# Patient Record
Sex: FEMALE | Race: WHITE
[De-identification: names, ages, dates, MRNs, and addresses within clinical notes are randomized per-mention and may not be internally consistent; named-entity substitution may affect disease eponyms.]

---

## 2018-07-30 ENCOUNTER — HOSPITAL ENCOUNTER (OUTPATIENT)
Dept: HOSPITAL 76 - LAB.WCP | Age: 68
Discharge: HOME | End: 2018-07-30
Attending: FAMILY MEDICINE
Payer: COMMERCIAL

## 2018-07-30 DIAGNOSIS — M25.50: ICD-10-CM

## 2018-07-30 DIAGNOSIS — R53.83: Primary | ICD-10-CM

## 2018-07-30 DIAGNOSIS — E78.5: ICD-10-CM

## 2018-07-30 LAB
ALBUMIN DIAFP-MCNC: 3.8 G/DL (ref 3.2–5.5)
ALBUMIN/GLOB SERPL: 1.1 {RATIO} (ref 1–2.2)
ALP SERPL-CCNC: 40 IU/L (ref 42–121)
ALT SERPL W P-5'-P-CCNC: 19 IU/L (ref 10–60)
ANION GAP SERPL CALCULATED.4IONS-SCNC: 5 MMOL/L (ref 6–13)
AST SERPL W P-5'-P-CCNC: 24 IU/L (ref 10–42)
BILIRUB BLD-MCNC: 0.5 MG/DL (ref 0.2–1)
BUN SERPL-MCNC: 13 MG/DL (ref 6–20)
CALCIUM UR-MCNC: 9.1 MG/DL (ref 8.5–10.3)
CHLORIDE SERPL-SCNC: 105 MMOL/L (ref 101–111)
CHOLEST SERPL-MCNC: 244 MG/DL
CO2 SERPL-SCNC: 27 MMOL/L (ref 21–32)
CREAT SERPLBLD-SCNC: 0.6 MG/DL (ref 0.4–1)
CRP SERPL-MCNC: < 1 MG/DL (ref 0–1)
ERYTHROCYTE [DISTWIDTH] IN BLOOD BY AUTOMATED COUNT: 12.7 % (ref 12–15)
GFRSERPLBLD MDRD-ARVRAT: 100 ML/MIN/{1.73_M2} (ref 89–?)
GLOBULIN SER-MCNC: 3.6 G/DL (ref 2.1–4.2)
GLUCOSE SERPL-MCNC: 92 MG/DL (ref 70–100)
HDLC SERPL-MCNC: 70 MG/DL
HDLC SERPL: 3.5 {RATIO} (ref ?–4.4)
HGB UR QL STRIP: 13.2 G/DL (ref 12–16)
LDLC SERPL CALC-MCNC: 151 MG/DL
LDLC/HDLC SERPL: 2.2 {RATIO} (ref ?–4.4)
MCH RBC QN AUTO: 32.8 PG (ref 27–31)
MCHC RBC AUTO-ENTMCNC: 33.8 G/DL (ref 32–36)
MCV RBC AUTO: 97.2 FL (ref 81–99)
NEUTROPHILS # SNV AUTO: 5.1 X10^3/UL (ref 4.8–10.8)
PDW BLD AUTO: 8.3 FL (ref 7.9–10.8)
PLATELET # BLD: 284 10^3/UL (ref 130–450)
PROT SPEC-MCNC: 7.4 G/DL (ref 6.7–8.2)
RBC MAR: 4.02 10^6/UL (ref 4.2–5.4)
RHEUMATOID FACT SER QL: NEGATIVE
SODIUM SERPLBLD-SCNC: 137 MMOL/L (ref 135–145)
URATE SERPL-MCNC: 3.9 MG/DL (ref 2.6–7.2)
VLDLC SERPL-SCNC: 23 MG/DL

## 2018-07-30 PROCEDURE — 84550 ASSAY OF BLOOD/URIC ACID: CPT

## 2018-07-30 PROCEDURE — 80053 COMPREHEN METABOLIC PANEL: CPT

## 2018-07-30 PROCEDURE — 85027 COMPLETE CBC AUTOMATED: CPT

## 2018-07-30 PROCEDURE — 36415 COLL VENOUS BLD VENIPUNCTURE: CPT

## 2018-07-30 PROCEDURE — 83721 ASSAY OF BLOOD LIPOPROTEIN: CPT

## 2018-07-30 PROCEDURE — 86140 C-REACTIVE PROTEIN: CPT

## 2018-07-30 PROCEDURE — 85651 RBC SED RATE NONAUTOMATED: CPT

## 2018-07-30 PROCEDURE — 84443 ASSAY THYROID STIM HORMONE: CPT

## 2018-07-30 PROCEDURE — 86430 RHEUMATOID FACTOR TEST QUAL: CPT

## 2018-07-30 PROCEDURE — 86200 CCP ANTIBODY: CPT

## 2018-07-30 PROCEDURE — 80061 LIPID PANEL: CPT

## 2018-08-13 ENCOUNTER — HOSPITAL ENCOUNTER (OUTPATIENT)
Dept: HOSPITAL 76 - DI | Age: 68
Discharge: HOME | End: 2018-08-13
Attending: FAMILY MEDICINE
Payer: COMMERCIAL

## 2018-08-13 DIAGNOSIS — Z78.0: ICD-10-CM

## 2018-08-13 DIAGNOSIS — M85.89: Primary | ICD-10-CM

## 2018-08-13 PROCEDURE — 77080 DXA BONE DENSITY AXIAL: CPT

## 2018-10-08 ENCOUNTER — HOSPITAL ENCOUNTER (OUTPATIENT)
Dept: HOSPITAL 76 - DI.N | Age: 68
Discharge: HOME | End: 2018-10-08
Attending: GENERAL ACUTE CARE HOSPITAL
Payer: COMMERCIAL

## 2018-10-08 DIAGNOSIS — N63.42: ICD-10-CM

## 2018-10-08 DIAGNOSIS — Z12.31: Primary | ICD-10-CM

## 2018-10-08 PROCEDURE — 77067 SCR MAMMO BI INCL CAD: CPT

## 2018-10-09 NOTE — MAMMOGRAPHY REPORT
Reason:  SCREENING MAMMO

Procedure Date:  10/08/2018   

Accession Number:  [id number] / [id number]                    

Procedure:  MGN - Screening Mammo Dig Bilat CPT Code:  

 

FULL RESULT:

 

 

EXAM: Screening Mammo Dig Bilat

 

DATE: 10/8/2018 10:43 AM

 

CLINICAL HISTORY: 68-year-old female presents for screening mammogram.

 

TECHNIQUE: Bilateral CC and MLO views were obtained.

 

COMPARISON: 10/24/2016.

 

FINDINGS:

The breasts demonstrate scattered fibroglandular densities bilaterally. 

Typically benign vascular calcifications are seen bilaterally. A 

hyperdense mass with ill-defined borders in the upper left breast 6 cm 

deep to the nipple not definitely seen on the cc view requires additional 

spot view evaluation ideally including tomography and possibly 

ultrasound. No clustered microcalcifications, or regions of architectural 

distortion are identified.

 

IMPRESSION: Incomplete examination

 

RECOMMENDATION: Additional evaluation as above.

 

BIRADS CATEGORY 0: Incomplete examination

 

STANDARD QUALIFYING STATEMENTS:

1.  This examination was reviewed with the aid of Computer-Aided 

Detection (CAD).

2.  A negative or benign  imaging report should not delay biopsy if 

clinically suspicious findings are present.  Consider surgical 

consultation if warrented.  More than 5% of cancers are not identified by 

imaging.

3.  Dense breasts may obscure an underlying neoplasm.

4. This examination was reviewed without the aid of 3D breast imaging 

(tomosynthesis).

## 2019-03-11 ENCOUNTER — HOSPITAL ENCOUNTER (OUTPATIENT)
Dept: HOSPITAL 76 - DI | Age: 69
Discharge: HOME | End: 2019-03-11
Attending: FAMILY MEDICINE
Payer: COMMERCIAL

## 2019-03-11 DIAGNOSIS — R92.8: Primary | ICD-10-CM

## 2019-03-11 NOTE — MAMMOGRAPHY REPORT
Reason:  ABN MAMMO - LT SPEC VIEWS

Procedure Date:  03/11/2019   

Accession Number:  [id number] / [id number]                    

Procedure:  HIRA - Diag Special Views Dig LT CPT Code:  

 

FULL RESULT:

 

 

EXAM: Diag Special Views Dig LT

 

DATE: 3/11/2019 9:24 AM

 

CLINICAL HISTORY:

 

TECHNIQUE: Recall from screening exam 10/8/2018 for one view asymmetry 

left MLO. No reported personal or family history of breast cancer.

 

COMPARISON: 10/8/2018, 10/24/2016

 

FINDINGS:

The breast demonstrates scattered fibroglandular density.

 

Left breast: There are no suspicious masses, calcifications or areas of 

distortion. One view asymmetry recalled from screening exam superior left 

MLO view does not persist on additional views, consistent with summation 

of normal tissues.

 

IMPRESSION: Negative examination

 

RECOMMENDATION: Routine annual screening unless otherwise clinically 

indicated.

 

BI-RADS CATEGORY 1: Negative

 

STANDARD QUALIFYING STATEMENTS:

1.  This examination was not reviewed with the aid of Computer-Aided 

Detection (CAD).

2.  A negative or benign  imaging report should not preclude biopsy if 

clinically suspicious findings are present.

3.  Dense breasts may obscure an underlying neoplasm.

4. This examination was reviewed with the aid of 3D breast imaging 

(tomosynthesis).

## 2019-08-15 ENCOUNTER — HOSPITAL ENCOUNTER (OUTPATIENT)
Dept: HOSPITAL 76 - SDS | Age: 69
Discharge: HOME | End: 2019-08-15
Attending: OPHTHALMOLOGY
Payer: COMMERCIAL

## 2019-08-15 VITALS — DIASTOLIC BLOOD PRESSURE: 62 MMHG | SYSTOLIC BLOOD PRESSURE: 114 MMHG

## 2019-08-15 DIAGNOSIS — H25.811: Primary | ICD-10-CM

## 2019-08-15 DIAGNOSIS — Z87.891: ICD-10-CM

## 2019-08-15 DIAGNOSIS — Z87.11: ICD-10-CM

## 2019-08-15 PROCEDURE — 66984 XCAPSL CTRC RMVL W/O ECP: CPT

## 2019-08-15 PROCEDURE — 08RJ3JZ REPLACEMENT OF RIGHT LENS WITH SYNTHETIC SUBSTITUTE, PERCUTANEOUS APPROACH: ICD-10-PCS | Performed by: OPHTHALMOLOGY

## 2019-08-15 NOTE — OPERATIVE REPORT
DATE OF SERVICE: 08/15/2019

Physician: Otis Brandt MD

 

PREOPERATIVE DIAGNOSIS:  Visually significant cataract, right eye.  This was her first cataract surge
ry.

 

POSTOPERATIVE DIAGNOSIS:  Visually significant cataract, right eye.  This was her first cataract surg
brian.

 

DESCRIPTION OF PROCEDURE:  Phacoemulsification with posterior chamber intraocular lens implant, right
 eye with laser assist.

 

SURGEON:  Otis Brandt MD

 

ANESTHESIA:  Monitored anesthesia care.

 

COMPLICATIONS:  None.

 

OPERATIVE INDICATIONS:  This is a 68-year-old woman with progressive vision loss in the right eye due
 to 2+ nuclear sclerotic and 2+ cortical cataract.  Best corrected visual acuity was 20/30 with glare
 to 20/125 in the right eye.  Indications for surgery were difficulty seeing words on a computer scre
en, difficulty reading, difficulty seeing words closed caption or game scores on TV, difficulty seein
g street signs and difficulty driving at night because of headlights from other vehicles.  She was co
nsented at length concerning risks and benefits of cataract surgery, after which she expressed a alyse
re to proceed with surgery.

 

OPERATIVE PROCEDURE:  The patient was taken to OR #3 and placed under monitored anesthesia care.  A s
urgical timeout was conducted confirming correct patient, correct procedure, and correct surgical sit
e.  She was placed on the LenSx laser and her eye was docked with the laser interface. The laser perf
ormed the capsulotomy, lens softening, phaco wounds and arcuate keratotomy incisions.  She was then m
robinson to the operating microscope, given topical anesthesia, and then prepped and draped in the usual 
sterile fashion.  The eye was entered at the 12 and 9 o'clock positions.  Intracameral Shugarcaine wa
s injected into the anterior chamber, followed by Viscoat.  Capsulorrhexis flap created by the LenSx 
laser was removed from the anterior chamber.  The nucleus was hydrodissected and phacoemulsified.  Th
e cortex was evacuated using automated infusion and aspiration.  Provisc was injected in the capsular
 bag, and a 19.0 diopter multifocal intraocular lens was injected into the bag.  Approximately 0.8 mL
 of a mixture of triamcinolone, moxifloxacin and vancomycin was injected subconjunctivally in the sup
erior quadrant for infection and inflammation prophylaxis.  I and A, was used to evacuate the viscoel
astic materials.  The eye was inflated to physiologic pressure using balanced salt solution and found
 to be watertight.  The patient was taken from the operating room in good condition and given postope
rative instructions.

 

 

DD: 08/15/2019 10:37

TD: 08/15/2019 14:07

Job #: [id number]

## 2019-08-15 NOTE — ANESTHESIA
Pre-Anesthesia VS, & Labs





- Diagnosis


R senile combined cataract





- Procedure





R laser assisted extraction cataract w IOL


Vital Signs: 





                                        











Temp Pulse Resp BP Pulse Ox


 


 36.5 C   84   18   130/65   94 


 


 08/15/19 08:46  08/15/19 08:46  08/15/19 08:46  08/15/19 08:46  08/15/19 08:46














                                        





Height                           5 ft 3 in


Weight (kg)                      97.4 kg


Body Mass Index                  33.6











- NPO


>8 hours





- Pregnancy


Is Patient Pregnant?: No





Home Medications and Allergies


Home Medications: 


Ambulatory Orders





Cyanocobalamin (Vitamin B-12) [Vitamin B-12] 1,000 mcg PO DAILY 08/15/19 


Glucos Sul 2Kcl/MSM/Chond/C/Mn [Glucosamine Chondroitin Cap] 1 each PO DAILY 

08/15/19 


RX: Montelukast Sodium 10 mg PO DAILY 08/15/19 


RX: Red Yeast Rice 600 mg PO DAILY 08/15/19 


RX: Vitamin E 400 unit PO DAILY 08/15/19 











                                        





Cyanocobalamin (Vitamin B-12) [Vitamin B-12] 1,000 mcg PO DAILY 08/15/19 


Glucos Sul 2Kcl/MSM/Chond/C/Mn [Glucosamine Chondroitin Cap] 1 each PO DAILY 

08/15/19 


Montelukast Sodium 10 mg PO DAILY 08/15/19 


Red Yeast Rice 600 mg PO DAILY 08/15/19 


Vitamin E 400 unit PO DAILY 08/15/19 








Allergies/Adverse Reactions: 


                                    Allergies











Allergy/AdvReac Type Severity Reaction Status Date / Time


 


bupropion HCl * AdvReac  Hallucinati Verified 08/15/19 08:45





[From Wellbutrin]   ons  














Anes History & Medical History





- Anesthetic History


Anesthesia Complications: reports: No previous complications


Family history of Anesthesia Complications: Denies


Family history of Malignant Hyperthermia: Denies





- Medical History


Cardiovascular: reports: None


Pulmonary: reports: None


Gastrointestinal: reports: Ulcers


Urinary: reports: None


Musculoskeletal: reports: None


Endocrine/Autoimmune: reports: None


Blood Disorders: reports: None


Skin: reports: None


Smoking Status: Former smoker





- Surgical History


Gynecologic: 





Exam


General: Alert, Oriented x3


Dental: WNL (one temp cap at back lower left)


Mouth Opening: 3 Fingerbreadth


Neck Mobility: Normal


Mallampati classification: II


Thyromental Distance: 4-6 cm


Respiratory: Lungs clear, Normal breath sounds, No respiratory distress, No 

accessory muscle use


Cardiovascular: Regular rate


Neurological: Normal speech


Mental/Cognitive Status: Alert/Oriented X3, Normal for patient


Cognitive Status: Within normal limits





Plan


Anesthesia Type: MAC


Consent for Procedure(s) Verified and Reviewed: Yes


Code Status: Attempt Resuscitation


ASA classification: 2-Mild systemic disease


Is this case an emergency?: No

## 2020-10-28 ENCOUNTER — HOSPITAL ENCOUNTER (OUTPATIENT)
Dept: HOSPITAL 76 - LAB.WCP | Age: 70
Discharge: HOME | End: 2020-10-28
Attending: FAMILY MEDICINE
Payer: COMMERCIAL

## 2020-10-28 DIAGNOSIS — M25.50: Primary | ICD-10-CM

## 2020-10-28 LAB
BASOPHILS NFR BLD AUTO: 0 10^3/UL (ref 0–0.1)
BASOPHILS NFR BLD AUTO: 0.6 %
CRP SERPL-MCNC: < 1 MG/DL (ref 0–1)
EOSINOPHIL # BLD AUTO: 0.2 10^3/UL (ref 0–0.7)
EOSINOPHIL NFR BLD AUTO: 4.1 %
ERYTHROCYTE [DISTWIDTH] IN BLOOD BY AUTOMATED COUNT: 12.8 % (ref 12–15)
HGB UR QL STRIP: 13.3 G/DL (ref 12–16)
LYMPHOCYTES # SPEC AUTO: 2.1 10^3/UL (ref 1.5–3.5)
LYMPHOCYTES NFR BLD AUTO: 39.8 %
MCH RBC QN AUTO: 32.1 PG (ref 27–31)
MCHC RBC AUTO-ENTMCNC: 32.3 G/DL (ref 32–36)
MCV RBC AUTO: 99.5 FL (ref 81–99)
MONOCYTES # BLD AUTO: 0.7 10^3/UL (ref 0–1)
MONOCYTES NFR BLD AUTO: 13.8 %
NEUTROPHILS # BLD AUTO: 2.1 10^3/UL (ref 1.5–6.6)
NEUTROPHILS # SNV AUTO: 5.2 X10^3/UL (ref 4.8–10.8)
NEUTROPHILS NFR BLD AUTO: 41.1 %
PDW BLD AUTO: 10 FL (ref 7.9–10.8)
PLATELET # BLD: 309 10^3/UL (ref 130–450)
RBC MAR: 4.14 10^6/UL (ref 4.2–5.4)
URATE SERPL-MCNC: 4.9 MG/DL (ref 2.6–7.2)

## 2020-10-28 PROCEDURE — 36415 COLL VENOUS BLD VENIPUNCTURE: CPT

## 2020-10-28 PROCEDURE — 85025 COMPLETE CBC W/AUTO DIFF WBC: CPT

## 2020-10-28 PROCEDURE — 84550 ASSAY OF BLOOD/URIC ACID: CPT

## 2020-10-28 PROCEDURE — 86140 C-REACTIVE PROTEIN: CPT

## 2020-10-28 PROCEDURE — 85651 RBC SED RATE NONAUTOMATED: CPT

## 2020-10-29 ENCOUNTER — HOSPITAL ENCOUNTER (OUTPATIENT)
Dept: HOSPITAL 76 - DI | Age: 70
Discharge: HOME | End: 2020-10-29
Attending: FAMILY MEDICINE
Payer: COMMERCIAL

## 2020-10-29 DIAGNOSIS — M47.816: Primary | ICD-10-CM

## 2020-10-29 PROCEDURE — 72100 X-RAY EXAM L-S SPINE 2/3 VWS: CPT

## 2020-10-29 NOTE — XRAY REPORT
PROCEDURE:  Lumbar Spine 2 View

 

INDICATIONS:  LOW BACK PAIN,ACUTE

 

TECHNIQUE:  2 views of the lumbar spine were acquired.  

 

COMPARISON:  None.

 

FINDINGS:  

 

Bones:  5 non-rib-bearing vertebrae are present.  There is grade 1 anterolisthesis of L4 on L5. No pa
rs defect visualized. Moderate degenerative changes of the mid and lower lumbar spine with associated
 facet arthropathy. Disc space loss at L4-5.  No acute vertebral body compression fractures.  No susp
icious bony lesions.  

 

Soft tissues:  Overlying bowel gas pattern is normal.  No suspicious soft tissue calcifications.  

 

IMPRESSION:  

 

Grade 1 anterolisthesis of L4 on L5.

 

Multilevel mid and lower lumbar spondylosis most pronounced at L4-5.

 

Reviewed by: Rivera Zheng MD on 10/29/2020 2:03 PM PDT

Approved by: Rivera Zheng MD on 10/29/2020 2:03 PM PDT

 

 

Station ID:  [id number]

## 2020-11-03 ENCOUNTER — HOSPITAL ENCOUNTER (OUTPATIENT)
Dept: HOSPITAL 76 - LAB.R | Age: 70
Discharge: HOME | End: 2020-11-03
Attending: FAMILY MEDICINE
Payer: COMMERCIAL

## 2020-11-03 DIAGNOSIS — J02.9: Primary | ICD-10-CM

## 2020-11-03 DIAGNOSIS — Z20.828: ICD-10-CM
